# Patient Record
Sex: MALE | Race: WHITE | Employment: OTHER | URBAN - NONMETROPOLITAN AREA
[De-identification: names, ages, dates, MRNs, and addresses within clinical notes are randomized per-mention and may not be internally consistent; named-entity substitution may affect disease eponyms.]

---

## 2017-08-20 ENCOUNTER — HOSPITAL ENCOUNTER (EMERGENCY)
Age: 62
Discharge: HOME | End: 2017-08-20
Attending: FAMILY MEDICINE
Payer: COMMERCIAL

## 2017-08-20 VITALS
DIASTOLIC BLOOD PRESSURE: 61 MMHG | OXYGEN SATURATION: 98 % | HEART RATE: 88 BPM | WEIGHT: 248 LBS | SYSTOLIC BLOOD PRESSURE: 168 MMHG | BODY MASS INDEX: 38.84 KG/M2 | TEMPERATURE: 98.2 F | RESPIRATION RATE: 20 BRPM

## 2017-08-20 DIAGNOSIS — R00.2 PALPITATIONS: Primary | ICD-10-CM

## 2017-08-20 DIAGNOSIS — E03.9 HYPOTHYROIDISM, UNSPECIFIED TYPE: ICD-10-CM

## 2017-08-20 DIAGNOSIS — I10 ESSENTIAL HYPERTENSION: ICD-10-CM

## 2017-08-20 LAB
ALBUMIN SERPL-MCNC: 3.9 G/DL (ref 3.5–5.1)
ALP BLD-CCNC: 44 U/L (ref 38–126)
ALT SERPL-CCNC: 22 U/L (ref 11–66)
ANION GAP SERPL CALCULATED.3IONS-SCNC: 22 MEQ/L (ref 8–16)
AST SERPL-CCNC: 17 U/L (ref 5–40)
BASOPHILS # BLD: 0.4 %
BASOPHILS ABSOLUTE: 0 THOU/MM3 (ref 0–0.1)
BILIRUB SERPL-MCNC: 0.5 MG/DL (ref 0.3–1.2)
BILIRUBIN DIRECT: < 0.2 MG/DL (ref 0–0.3)
BUN BLDV-MCNC: 29 MG/DL (ref 7–22)
CALCIUM SERPL-MCNC: 9.2 MG/DL (ref 8.5–10.5)
CHLORIDE BLD-SCNC: 96 MEQ/L (ref 98–111)
CO2: 23 MEQ/L (ref 23–33)
CREAT SERPL-MCNC: 1.1 MG/DL (ref 0.4–1.2)
EOSINOPHIL # BLD: 6.1 %
EOSINOPHILS ABSOLUTE: 0.6 THOU/MM3 (ref 0–0.4)
GFR SERPL CREATININE-BSD FRML MDRD: 68 ML/MIN/1.73M2
GLUCOSE BLD-MCNC: 110 MG/DL (ref 70–108)
HCT VFR BLD CALC: 43.5 % (ref 42–52)
HEMOGLOBIN: 14.6 GM/DL (ref 14–18)
LYMPHOCYTES # BLD: 27.5 %
LYMPHOCYTES ABSOLUTE: 2.9 THOU/MM3 (ref 1–4.8)
MAGNESIUM: 1.6 MG/DL (ref 1.6–2.4)
MCH RBC QN AUTO: 29.8 PG (ref 27–31)
MCHC RBC AUTO-ENTMCNC: 33.5 GM/DL (ref 33–37)
MCV RBC AUTO: 89 FL (ref 80–94)
MONOCYTES # BLD: 6.9 %
MONOCYTES ABSOLUTE: 0.7 THOU/MM3 (ref 0.4–1.3)
NUCLEATED RED BLOOD CELLS: 0 /100 WBC
OSMOLALITY CALCULATION: 287.7 MOSMOL/KG (ref 275–300)
PDW BLD-RTO: 14.2 % (ref 11.5–14.5)
PLATELET # BLD: 328 THOU/MM3 (ref 130–400)
PMV BLD AUTO: 8.1 MCM (ref 7.4–10.4)
POTASSIUM SERPL-SCNC: 4 MEQ/L (ref 3.5–5.2)
RBC # BLD: 4.89 MILL/MM3 (ref 4.7–6.1)
RBC # BLD: NORMAL 10*6/UL
SEG NEUTROPHILS: 59.1 %
SEGMENTED NEUTROPHILS ABSOLUTE COUNT: 6.3 THOU/MM3 (ref 1.8–7.7)
SODIUM BLD-SCNC: 141 MEQ/L (ref 135–145)
TOTAL PROTEIN: 7.4 G/DL (ref 6.1–8)
TSH SERPL DL<=0.05 MIU/L-ACNC: 4.74 UIU/ML (ref 0.4–4.2)
WBC # BLD: 10.6 THOU/MM3 (ref 4.8–10.8)

## 2017-08-20 PROCEDURE — 83735 ASSAY OF MAGNESIUM: CPT

## 2017-08-20 PROCEDURE — 85025 COMPLETE CBC W/AUTO DIFF WBC: CPT

## 2017-08-20 PROCEDURE — 80053 COMPREHEN METABOLIC PANEL: CPT

## 2017-08-20 PROCEDURE — 36415 COLL VENOUS BLD VENIPUNCTURE: CPT

## 2017-08-20 PROCEDURE — 82248 BILIRUBIN DIRECT: CPT

## 2017-08-20 PROCEDURE — 84443 ASSAY THYROID STIM HORMONE: CPT

## 2017-08-20 PROCEDURE — 99285 EMERGENCY DEPT VISIT HI MDM: CPT

## 2017-08-20 PROCEDURE — 84439 ASSAY OF FREE THYROXINE: CPT

## 2017-08-20 PROCEDURE — 93005 ELECTROCARDIOGRAM TRACING: CPT

## 2017-08-20 RX ORDER — LEVOTHYROXINE SODIUM 0.1 MG/1
100 TABLET ORAL DAILY
COMMUNITY

## 2017-08-20 ASSESSMENT — ENCOUNTER SYMPTOMS
COUGH: 0
DIARRHEA: 0
ABDOMINAL PAIN: 0
BACK PAIN: 0
WHEEZING: 0
SHORTNESS OF BREATH: 0
EYE DISCHARGE: 0

## 2017-08-21 LAB
EKG ATRIAL RATE: 89 BPM
EKG P AXIS: 49 DEGREES
EKG P-R INTERVAL: 152 MS
EKG Q-T INTERVAL: 374 MS
EKG QRS DURATION: 84 MS
EKG QTC CALCULATION (BAZETT): 455 MS
EKG R AXIS: 52 DEGREES
EKG T AXIS: 15 DEGREES
EKG VENTRICULAR RATE: 89 BPM
T4 FREE: 1.52 NG/DL (ref 0.93–1.76)

## 2018-04-05 ENCOUNTER — HOSPITAL ENCOUNTER (OUTPATIENT)
Age: 63
Discharge: HOME | End: 2018-04-05
Payer: COMMERCIAL

## 2018-04-05 ENCOUNTER — HOSPITAL ENCOUNTER (OUTPATIENT)
Dept: GENERAL RADIOLOGY | Age: 63
Discharge: HOME | End: 2018-04-05
Payer: COMMERCIAL

## 2018-04-05 DIAGNOSIS — M25.512 LEFT SHOULDER PAIN, UNSPECIFIED CHRONICITY: ICD-10-CM

## 2018-04-05 PROCEDURE — 73030 X-RAY EXAM OF SHOULDER: CPT

## 2018-06-09 ENCOUNTER — HOSPITAL ENCOUNTER (OUTPATIENT)
Dept: MRI IMAGING | Age: 63
Discharge: HOME | End: 2018-06-09
Payer: COMMERCIAL

## 2018-06-09 DIAGNOSIS — R93.7 ABNORMAL X-RAY OF SPINE: ICD-10-CM

## 2018-06-09 PROCEDURE — 72148 MRI LUMBAR SPINE W/O DYE: CPT

## 2020-06-23 ENCOUNTER — APPOINTMENT (OUTPATIENT)
Dept: MRI IMAGING | Age: 65
End: 2020-06-23
Payer: MEDICARE

## 2020-06-23 ENCOUNTER — APPOINTMENT (OUTPATIENT)
Dept: CT IMAGING | Age: 65
End: 2020-06-23
Payer: MEDICARE

## 2020-06-23 ENCOUNTER — HOSPITAL ENCOUNTER (EMERGENCY)
Age: 65
Discharge: HOME | End: 2020-06-23
Attending: FAMILY MEDICINE
Payer: MEDICARE

## 2020-06-23 VITALS
TEMPERATURE: 97.9 F | DIASTOLIC BLOOD PRESSURE: 60 MMHG | OXYGEN SATURATION: 97 % | HEIGHT: 71 IN | HEART RATE: 77 BPM | SYSTOLIC BLOOD PRESSURE: 126 MMHG | BODY MASS INDEX: 34.02 KG/M2 | RESPIRATION RATE: 18 BRPM | WEIGHT: 243 LBS

## 2020-06-23 LAB
ANION GAP SERPL CALCULATED.3IONS-SCNC: 16 MEQ/L (ref 8–16)
BASOPHILS # BLD: 0.6 %
BASOPHILS ABSOLUTE: 0.1 THOU/MM3 (ref 0–0.1)
BUN BLDV-MCNC: 35 MG/DL (ref 7–22)
CALCIUM SERPL-MCNC: 9.5 MG/DL (ref 8.5–10.5)
CHLORIDE BLD-SCNC: 96 MEQ/L (ref 98–111)
CO2: 26 MEQ/L (ref 23–33)
CREAT SERPL-MCNC: 1.4 MG/DL (ref 0.4–1.2)
EKG ATRIAL RATE: 79 BPM
EKG P AXIS: 46 DEGREES
EKG P-R INTERVAL: 158 MS
EKG Q-T INTERVAL: 398 MS
EKG QRS DURATION: 94 MS
EKG QTC CALCULATION (BAZETT): 456 MS
EKG R AXIS: 34 DEGREES
EKG T AXIS: 21 DEGREES
EKG VENTRICULAR RATE: 79 BPM
EOSINOPHIL # BLD: 4.7 %
EOSINOPHILS ABSOLUTE: 0.5 THOU/MM3 (ref 0–0.4)
ERYTHROCYTE [DISTWIDTH] IN BLOOD BY AUTOMATED COUNT: 14.3 % (ref 11.5–14.5)
ERYTHROCYTE [DISTWIDTH] IN BLOOD BY AUTOMATED COUNT: 49.1 FL (ref 35–45)
GFR SERPL CREATININE-BSD FRML MDRD: 51 ML/MIN/1.73M2
GLUCOSE BLD-MCNC: 127 MG/DL (ref 70–108)
GLUCOSE BLD-MCNC: 133 MG/DL (ref 70–108)
HCT VFR BLD CALC: 44.6 % (ref 42–52)
HEMOGLOBIN: 14.2 GM/DL (ref 14–18)
IMMATURE GRANS (ABS): 0.07 THOU/MM3 (ref 0–0.07)
IMMATURE GRANULOCYTES: 0.7 %
LYMPHOCYTES # BLD: 13.1 %
LYMPHOCYTES ABSOLUTE: 1.3 THOU/MM3 (ref 1–4.8)
MCH RBC QN AUTO: 29.6 PG (ref 26–33)
MCHC RBC AUTO-ENTMCNC: 31.8 GM/DL (ref 32.2–35.5)
MCV RBC AUTO: 93.1 FL (ref 80–94)
MONOCYTES # BLD: 6.1 %
MONOCYTES ABSOLUTE: 0.6 THOU/MM3 (ref 0.4–1.3)
NUCLEATED RED BLOOD CELLS: 0 /100 WBC
OSMOLALITY CALCULATION: 285.6 MOSMOL/KG (ref 275–300)
PLATELET # BLD: 397 THOU/MM3 (ref 130–400)
PMV BLD AUTO: 10 FL (ref 9.4–12.4)
POTASSIUM SERPL-SCNC: 4.4 MEQ/L (ref 3.5–5.2)
RBC # BLD: 4.79 MILL/MM3 (ref 4.7–6.1)
SEG NEUTROPHILS: 74.8 %
SEGMENTED NEUTROPHILS ABSOLUTE COUNT: 7.6 THOU/MM3 (ref 1.8–7.7)
SODIUM BLD-SCNC: 138 MEQ/L (ref 135–145)
TROPONIN T: < 0.01 NG/ML
WBC # BLD: 10.2 THOU/MM3 (ref 4.8–10.8)

## 2020-06-23 PROCEDURE — 82948 REAGENT STRIP/BLOOD GLUCOSE: CPT

## 2020-06-23 PROCEDURE — 84484 ASSAY OF TROPONIN QUANT: CPT

## 2020-06-23 PROCEDURE — 93005 ELECTROCARDIOGRAM TRACING: CPT | Performed by: FAMILY MEDICINE

## 2020-06-23 PROCEDURE — 99448 NTRPROF PH1/NTRNET/EHR 21-30: CPT | Performed by: PSYCHIATRY & NEUROLOGY

## 2020-06-23 PROCEDURE — 36415 COLL VENOUS BLD VENIPUNCTURE: CPT

## 2020-06-23 PROCEDURE — 99284 EMERGENCY DEPT VISIT MOD MDM: CPT

## 2020-06-23 PROCEDURE — 80048 BASIC METABOLIC PNL TOTAL CA: CPT

## 2020-06-23 PROCEDURE — 70450 CT HEAD/BRAIN W/O DYE: CPT

## 2020-06-23 PROCEDURE — 70551 MRI BRAIN STEM W/O DYE: CPT

## 2020-06-23 PROCEDURE — 93010 ELECTROCARDIOGRAM REPORT: CPT | Performed by: INTERNAL MEDICINE

## 2020-06-23 PROCEDURE — 85025 COMPLETE CBC W/AUTO DIFF WBC: CPT

## 2020-06-23 PROCEDURE — 2709999900 HC NON-CHARGEABLE SUPPLY

## 2020-06-23 ASSESSMENT — ENCOUNTER SYMPTOMS
VOMITING: 0
ABDOMINAL PAIN: 0
NAUSEA: 0
SHORTNESS OF BREATH: 0

## 2020-06-23 NOTE — ED PROVIDER NOTES
further treatment    CTA head neck can be deferred at this time after discussion with Jeff Pineda    I have informed the patient of our plan who is in agreement    MRI of the brain obtained was negative    Dr. Jessi Lauren also look this over and was comfortable with no evidence of a stroke    CBC was normal    Random sugar 133    BUN 35, creatinine 1.4    His diabetes and hypertension are reasonably good control    Remains sinus rhythm     recommended patient have outpatient neurology follow-up    I did talk to [address] [place] [address] who will follow in the office    The patient may have an incomplete Bell's palsy with just the lower facial weakness    We did discuss this with the patient that we will not start on steroids at this time    Recommend follow-up with his primary care to recheck in the next few days    Call for neurology follow-up            CRITICAL CARE:   none    CONSULTS:  Dr. Jessi Harper:  None    FINAL IMPRESSION      1.  Facial weakness          DISPOSITION/PLAN     Discharge home    PATIENT REFERRED TO:  Raimundo Roa MD  [address] [place] [phone]    In 1 day  To recheck the facial weakness, follow for possible Bell's palsy    Timo Junior MD  [address] [place], [place] [phone]    Schedule an appointment as soon as possible for a visit   Call office for appointment for follow-up on right facial weakness      DISCHARGE MEDICATIONS:  Discharge Medication List as of 6/23/2020  1:56 PM          (Please note that portions of this note were completed with a voice recognition program.  Efforts were made to edit the dictations but occasionally words are mis-transcribed.)    MD Jayy Galaviz MD  06/23/20 8532

## 2020-06-23 NOTE — ED NOTES
Bed: 001A  Expected date: 6/23/20  Expected time: 9:52 AM  Means of arrival: [address] [facility]  Comments:     Jer Jiménez RN  06/23/20 1003

## 2020-06-23 NOTE — ED NOTES
Patient to [facility]'s ER from PCP office for right sided facial droop/numbness and numbness in fingers in right hand. Patient states he noticed symptoms this AM while brushing is teeth at 0830. Patient reports going to bed around 0030 last night. On arrival to ER patient is alert and oriented. Patient still having facial numbness and hand numbness. Patient denies chest pain SOB nausea vomiting or diarrhea.  Patient denies headache, dizziness or visual changes     Iris Tse RN  06/23/20 5210

## 2020-07-01 ENCOUNTER — INITIAL CONSULT (OUTPATIENT)
Dept: NEUROLOGY | Age: 65
End: 2020-07-01
Payer: MEDICARE

## 2020-07-01 VITALS
WEIGHT: 249.2 LBS | HEIGHT: 66 IN | BODY MASS INDEX: 40.05 KG/M2 | HEART RATE: 84 BPM | DIASTOLIC BLOOD PRESSURE: 58 MMHG | SYSTOLIC BLOOD PRESSURE: 116 MMHG | TEMPERATURE: 98.1 F | OXYGEN SATURATION: 97 %

## 2020-07-01 PROBLEM — G51.0 RIGHT-SIDED BELL'S PALSY: Status: ACTIVE | Noted: 2020-07-01

## 2020-07-01 PROBLEM — Q67.0 FACIAL ASYMMETRY: Status: ACTIVE | Noted: 2020-07-01

## 2020-07-01 PROCEDURE — 99204 OFFICE O/P NEW MOD 45 MIN: CPT | Performed by: PSYCHIATRY & NEUROLOGY

## 2020-07-01 RX ORDER — MAGNESIUM OXIDE 400 MG/1
400 TABLET ORAL DAILY
COMMUNITY

## 2020-07-01 RX ORDER — ACETAMINOPHEN 160 MG
TABLET,DISINTEGRATING ORAL
COMMUNITY

## 2020-07-01 NOTE — PROGRESS NOTES
D3) 50 MCG (2000 UT) CAPS Take by mouth      Cinnamon 500 MG TABS Take 1,000 mg by mouth 2 times daily      levothyroxine (SYNTHROID) 100 MCG tablet Take 100 mcg by mouth Daily      aspirin 81 MG tablet Take 1 tablet by mouth daily 1 tablet 0    sitaGLIPtan-metformin (JANUMET)  MG per tablet Take 2 tablets by mouth daily.  losartan-hydrochlorothiazide (HYZAAR) 100-25 MG per tablet Take 1 tablet by mouth daily.  furosemide (LASIX) 40 MG tablet Take 1 tablet by mouth daily.  pioglitazone (ACTOS) 45 MG tablet Take 1 tablet by mouth daily.  ONE TOUCH ULTRA TEST strip as needed.  LANTUS SOLOSTAR 100 UNIT/ML injection Inject 50 Units into the skin nightly       UNIFINE PENTIPS 29G X 12MM MISC as needed.  cetirizine (ZYRTEC) 10 MG tablet Take 10 mg by mouth daily. No current facility-administered medications for this visit.         Social History     Socioeconomic History    Marital status:      Spouse name: None    Number of children: None    Years of education: None    Highest education level: None   Occupational History    None   Social Needs    Financial resource strain: None    Food insecurity     Worry: None     Inability: None    Transportation needs     Medical: None     Non-medical: None   Tobacco Use    Smoking status: Former Smoker     Last attempt to quit: 2010     Years since quitting: 10.5    Smokeless tobacco: Never Used   Substance and Sexual Activity    Alcohol use: No    Drug use: No    Sexual activity: None   Lifestyle    Physical activity     Days per week: None     Minutes per session: None    Stress: None   Relationships    Social connections     Talks on phone: None     Gets together: None     Attends Spiritism service: None     Active member of club or organization: None     Attends meetings of clubs or organizations: None     Relationship status: None    Intimate partner violence     Fear of current or ex partner: None Abnormal movement none, vibration normal, proprioception normal  Skin - warm, dry to touch, normal coloration, no rashes, no suspicious skin lesions  Superficial temporal artery pulses are normal.   There is no limitation of range of motion of the neck. There is no resting tremor, no pin rolling, no bradykinesia, no Hypohonia, normal blink rate. Musculoskeletal: Has no hand arthritis, no limitation of ROM in any of the four extremities. There is no leg edema . The Heart was regular in rate and rhythm. No heart murmur  Chest Clear, with good effort. Abdomen soft, intact bowel sounds. I reviewed the MRI  brain and agree with interpretation. Results for orders placed during the hospital encounter of 06/23/20   MRI BRAIN WO CONTRAST    Narrative PROCEDURE: MRI BRAIN WO CONTRAST    CLINICAL INFORMATION stroke. Right facial droop. Numbness of the right third and fourth fingers. COMPARISON: No prior study. TECHNIQUE: Multiplanar and multiple spin echo MRI images were obtained of the brain without contrast. Head CT 6/23/2020. FINDINGS:        The diffusion-weighted images are normal.  The brain volume is normal. There is minimal signal hyperintensity scattered in the white matter of the brain suggestive of minimal severity chronic small vessel ischemic changes. There are no intra-or extra-axial collections. There is no hydrocephalus, midline shift or mass effect. There is no susceptibility artifact in the brain. The major intracranial vascular flow voids are present. The midline craniocervical junction structures are normal.  The pituitary gland and brainstem are normal.    There is a small subcutaneous right frontal lipoma in the right for head. There is a small area of mucosal thickening along the floor the left maxillary sinus. Impression    1. No evidence of an acute infarct. 2. Minimal severity chronic small vessel ischemic changes.           **This report has been created using voice recognition software. It may contain minor errors which are inherent in voice recognition technology. **    Final report electronically signed by Dr. Adair Babinski on 6/23/2020 1:05 PM     Reviewed   Results for orders placed during the hospital encounter of 06/23/20   CT HEAD WO CONTRAST (CODE STROKE)    Narrative PROCEDURE: CT HEAD WO CONTRAST    CLINICAL INFORMATION: stroke like symptoms. COMPARISON: No prior study. TECHNIQUE: Noncontrast 5 mm axial images were obtained through the brain. Sagittal and coronal reconstructions were obtained. All CT scans at this facility use dose modulation, iterative reconstruction, and/or weight-based dosing when appropriate to reduce radiation dose to as low as reasonably achievable. FINDINGS:    The brain volume is mildly reduced. There is no hemorrhage. There are no intra-or extra-axial collections. There is no hydrocephalus, midline shift or mass effect. The gray-white matter differentiation is preserved. The paranasal sinuses and mastoid air cells are normally aerated. There is no suspicious calvarial abnormality. Impression  No evidence of an acute process. **This report has been created using voice recognition software. It may contain minor errors which are inherent in voice recognition technology. **    Final report electronically signed by Dr. Adair Babinski on 6/23/2020 10:21 AM     We reviewed the patient records from referring provider and available information in the EHR       ASSESSMENT:      Diagnosis Orders   1. Right-sided Bell's palsy          This is an 71year old male who presents with right sided Bell's palsy symptoms that are mild on symptoms of are ongoing for at least 8 days. He is able to shut his eye, there is improvement with the hypersalivation on the right side. MRI brain was reviewed showing no evidence of acute process. I reviewed his emergency room evaluation.  The patient was educated about

## 2020-07-01 NOTE — PATIENT INSTRUCTIONS
Protect your eye to prevent exposure keratitis (as we discussed with you). Use artifical tears to keep eye moist at all times. Tape eye shut at night as explained. Follow up with your eye doctor as planned. Consider physical therapy evaluation re Alna palsy on the right. Return/call if symptoms worsen. Call with any new symptoms or concerns.